# Patient Record
Sex: FEMALE | Race: ASIAN | NOT HISPANIC OR LATINO
[De-identification: names, ages, dates, MRNs, and addresses within clinical notes are randomized per-mention and may not be internally consistent; named-entity substitution may affect disease eponyms.]

---

## 2022-06-06 PROBLEM — Z00.00 ENCOUNTER FOR PREVENTIVE HEALTH EXAMINATION: Status: ACTIVE | Noted: 2022-06-06

## 2022-06-08 ENCOUNTER — APPOINTMENT (OUTPATIENT)
Dept: ORTHOPEDIC SURGERY | Facility: CLINIC | Age: 32
End: 2022-06-08
Payer: MEDICAID

## 2022-06-08 VITALS — BODY MASS INDEX: 37.36 KG/M2 | WEIGHT: 203 LBS | HEIGHT: 62 IN

## 2022-06-08 DIAGNOSIS — M17.11 UNILATERAL PRIMARY OSTEOARTHRITIS, RIGHT KNEE: ICD-10-CM

## 2022-06-08 PROCEDURE — 73562 X-RAY EXAM OF KNEE 3: CPT | Mod: LT

## 2022-06-08 PROCEDURE — 99203 OFFICE O/P NEW LOW 30 MIN: CPT

## 2022-06-08 NOTE — ASSESSMENT
[FreeTextEntry1] : PT, tumeric. ice. wegiht loss. fu 6 wk\par \par The patient was advised of the diagnosis.  The natural history of the pathology was explained in full. All questions were answered.  The risks and benefits of conservative and interventional treatment alternatives were explained to the patient\par

## 2022-06-08 NOTE — IMAGING
[de-identified] : Left knee exam: \par \par General: no distress, no ligamentous laxity\par Skin: no significant pertinent finding\par Inspection: \par    Effusion: min\par    Malalignment: (-)\par    Swelling: (-)\par    Quad atrophy: (-)\par    J-sign: (-)\par ROM: \par    0 - 120 degrees of flexion.\par Tenderness: \par    MJLT: +\par    LJLT: +\par    Medial patellar facet tenderness: +\par    Lateral patellar facet tenderness: (-)\par    Crepitus: (-)\par    Patellar grind tenderness: (-)\par Stability: \par    Lachman: (-)\par    Varus/Valgus instability: (-)\par    Posterior drawer: (-)\par Additional tests: \par    McMurrays test: (-)\par    Patellar apprehension: (-)\par    Patellar tilt: (-)\par    Tight lateral retinaculum: (-)\par Strength: 5/5 Q/H/TA/GS/EHL\par Neuro: In tact to light touch throughout, DTR's wnl\par Vascularity: Extremity warm and well perfused\par Gait: normal.\par \par \par

## 2022-06-08 NOTE — HISTORY OF PRESENT ILLNESS
[Gradual] : gradual [3] : 3 [Dull/Aching] : dull/aching [Occasional] : occasional [de-identified] : This is Ms. RENE LOMBARDO  a 31 year female who comes in today complaining of left knee pain. pain is medial. notes pain with stairs, bending. no injjury. present for 2 weeks. previously present but resolved.  [] : no [de-identified] : bending leg

## 2022-09-25 ENCOUNTER — EMERGENCY (EMERGENCY)
Facility: HOSPITAL | Age: 32
LOS: 1 days | Discharge: HOME | End: 2022-09-25
Attending: STUDENT IN AN ORGANIZED HEALTH CARE EDUCATION/TRAINING PROGRAM | Admitting: STUDENT IN AN ORGANIZED HEALTH CARE EDUCATION/TRAINING PROGRAM

## 2022-09-25 VITALS
SYSTOLIC BLOOD PRESSURE: 132 MMHG | TEMPERATURE: 99 F | DIASTOLIC BLOOD PRESSURE: 93 MMHG | OXYGEN SATURATION: 100 % | HEART RATE: 76 BPM | RESPIRATION RATE: 16 BRPM

## 2022-09-25 VITALS
DIASTOLIC BLOOD PRESSURE: 60 MMHG | RESPIRATION RATE: 18 BRPM | OXYGEN SATURATION: 100 % | HEART RATE: 56 BPM | SYSTOLIC BLOOD PRESSURE: 112 MMHG | TEMPERATURE: 98 F

## 2022-09-25 LAB
ALBUMIN SERPL ELPH-MCNC: 4.1 G/DL — SIGNIFICANT CHANGE UP (ref 3.3–5)
ALP SERPL-CCNC: 78 U/L — SIGNIFICANT CHANGE UP (ref 40–120)
ALT FLD-CCNC: 18 U/L — SIGNIFICANT CHANGE UP (ref 4–33)
ANION GAP SERPL CALC-SCNC: 11 MMOL/L — SIGNIFICANT CHANGE UP (ref 7–14)
APTT BLD: 36.8 SEC — HIGH (ref 27–36.3)
AST SERPL-CCNC: 23 U/L — SIGNIFICANT CHANGE UP (ref 4–32)
BASOPHILS # BLD AUTO: 0.02 K/UL — SIGNIFICANT CHANGE UP (ref 0–0.2)
BASOPHILS NFR BLD AUTO: 0.2 % — SIGNIFICANT CHANGE UP (ref 0–2)
BILIRUB SERPL-MCNC: 0.3 MG/DL — SIGNIFICANT CHANGE UP (ref 0.2–1.2)
BLD GP AB SCN SERPL QL: NEGATIVE — SIGNIFICANT CHANGE UP
BUN SERPL-MCNC: 9 MG/DL — SIGNIFICANT CHANGE UP (ref 7–23)
CALCIUM SERPL-MCNC: 9.9 MG/DL — SIGNIFICANT CHANGE UP (ref 8.4–10.5)
CHLORIDE SERPL-SCNC: 103 MMOL/L — SIGNIFICANT CHANGE UP (ref 98–107)
CO2 SERPL-SCNC: 24 MMOL/L — SIGNIFICANT CHANGE UP (ref 22–31)
CREAT SERPL-MCNC: 0.7 MG/DL — SIGNIFICANT CHANGE UP (ref 0.5–1.3)
EGFR: 117 ML/MIN/1.73M2 — SIGNIFICANT CHANGE UP
EOSINOPHIL # BLD AUTO: 0.14 K/UL — SIGNIFICANT CHANGE UP (ref 0–0.5)
EOSINOPHIL NFR BLD AUTO: 1.1 % — SIGNIFICANT CHANGE UP (ref 0–6)
GLUCOSE SERPL-MCNC: 90 MG/DL — SIGNIFICANT CHANGE UP (ref 70–99)
HCG SERPL-ACNC: 5532 MIU/ML — SIGNIFICANT CHANGE UP
HCT VFR BLD CALC: 38.6 % — SIGNIFICANT CHANGE UP (ref 34.5–45)
HGB BLD-MCNC: 12.7 G/DL — SIGNIFICANT CHANGE UP (ref 11.5–15.5)
IANC: 8.4 K/UL — HIGH (ref 1.8–7.4)
IMM GRANULOCYTES NFR BLD AUTO: 0.3 % — SIGNIFICANT CHANGE UP (ref 0–0.9)
INR BLD: 1.13 RATIO — SIGNIFICANT CHANGE UP (ref 0.88–1.16)
LIDOCAIN IGE QN: 31 U/L — SIGNIFICANT CHANGE UP (ref 7–60)
LYMPHOCYTES # BLD AUTO: 2.87 K/UL — SIGNIFICANT CHANGE UP (ref 1–3.3)
LYMPHOCYTES # BLD AUTO: 23.1 % — SIGNIFICANT CHANGE UP (ref 13–44)
MCHC RBC-ENTMCNC: 27.7 PG — SIGNIFICANT CHANGE UP (ref 27–34)
MCHC RBC-ENTMCNC: 32.9 GM/DL — SIGNIFICANT CHANGE UP (ref 32–36)
MCV RBC AUTO: 84.1 FL — SIGNIFICANT CHANGE UP (ref 80–100)
MONOCYTES # BLD AUTO: 0.93 K/UL — HIGH (ref 0–0.9)
MONOCYTES NFR BLD AUTO: 7.5 % — SIGNIFICANT CHANGE UP (ref 2–14)
NEUTROPHILS # BLD AUTO: 8.4 K/UL — HIGH (ref 1.8–7.4)
NEUTROPHILS NFR BLD AUTO: 67.8 % — SIGNIFICANT CHANGE UP (ref 43–77)
NRBC # BLD: 0 /100 WBCS — SIGNIFICANT CHANGE UP (ref 0–0)
NRBC # FLD: 0 K/UL — SIGNIFICANT CHANGE UP (ref 0–0)
PLATELET # BLD AUTO: 230 K/UL — SIGNIFICANT CHANGE UP (ref 150–400)
POTASSIUM SERPL-MCNC: 4.3 MMOL/L — SIGNIFICANT CHANGE UP (ref 3.5–5.3)
POTASSIUM SERPL-SCNC: 4.3 MMOL/L — SIGNIFICANT CHANGE UP (ref 3.5–5.3)
PROT SERPL-MCNC: 8.1 G/DL — SIGNIFICANT CHANGE UP (ref 6–8.3)
PROTHROM AB SERPL-ACNC: 13.1 SEC — SIGNIFICANT CHANGE UP (ref 10.5–13.4)
RBC # BLD: 4.59 M/UL — SIGNIFICANT CHANGE UP (ref 3.8–5.2)
RBC # FLD: 13.2 % — SIGNIFICANT CHANGE UP (ref 10.3–14.5)
RH IG SCN BLD-IMP: POSITIVE — SIGNIFICANT CHANGE UP
SODIUM SERPL-SCNC: 138 MMOL/L — SIGNIFICANT CHANGE UP (ref 135–145)
WBC # BLD: 12.4 K/UL — HIGH (ref 3.8–10.5)
WBC # FLD AUTO: 12.4 K/UL — HIGH (ref 3.8–10.5)

## 2022-09-25 PROCEDURE — 99285 EMERGENCY DEPT VISIT HI MDM: CPT

## 2022-09-25 PROCEDURE — 76830 TRANSVAGINAL US NON-OB: CPT | Mod: 26

## 2022-09-25 RX ORDER — SODIUM CHLORIDE 9 MG/ML
1000 INJECTION INTRAMUSCULAR; INTRAVENOUS; SUBCUTANEOUS ONCE
Refills: 0 | Status: COMPLETED | OUTPATIENT
Start: 2022-09-25 | End: 2022-09-25

## 2022-09-25 RX ADMIN — SODIUM CHLORIDE 1000 MILLILITER(S): 9 INJECTION INTRAMUSCULAR; INTRAVENOUS; SUBCUTANEOUS at 13:53

## 2022-09-25 NOTE — ED PROVIDER NOTE - NSFOLLOWUPINSTRUCTIONS_ED_ALL_ED_FT
You  have been seen for vaginal bleeding.     Your results are in the following pages.    As discussed, please follow up with your OBGYN at your scheduled appointment in 2 days.     Please return to the emergency department for any new or concerning symptoms including but not limited to fever, pain, worsening bleeding, weakness, dizziness, chest pain, shortness of breath.

## 2022-09-25 NOTE — ED ADULT TRIAGE NOTE - CHIEF COMPLAINT QUOTE
Pt states she is 10 weeks pregnant c/o of cramps and vaginal bleeding was scheduled for D@C on Tuesday but pain is worse today and having heavy vaginal bleeding

## 2022-09-25 NOTE — ED ADULT NURSE NOTE - OBJECTIVE STATEMENT
pt received at intake rm 1 AAO x 3. pt reports she is scheduled for D & C on tuesday. pt reports 10 week pregnancy. pt c/o vaginal bleeding since last night soaking about 8 pads and passing large clots. pt also reports dizziness, chills and fevers. pt denies sob, chest pain, n/v/d. respirations even and unlabored. 20g iv placed to right ac.

## 2022-09-25 NOTE — ED PROVIDER NOTE - NS ED ROS FT
ROS   GENERAL: No fever, no chills, no malaise, no fatigue   ENT: No earache, no coryza, no sore throat   NECK: No stiffness, no swollen glands, no dysphagia   RESPIRATORY: No cough, no dyspnea, no pleuritic chest pain   HEART: no chest pain, no palpitations, no edema, no jvd   ABDOMEN: No abdominal pain, no nausea, no vomiting, no diarrhea   : SEE HPI  MUSCULAR-SKELETAL: No myalgia, no arthralgia   NEUROLOGY: No headache, no vertigo, no paresthesia, no focal deficits, no diplopia   SKIN: No rash, no evidence of trauma  All other ROS are negative

## 2022-09-25 NOTE — CONSULT NOTE ADULT - ASSESSMENT
30yo  @ 12w1d by LMP 2022 presenting for worsening vaginal bleeding and abdominal cramping after diagnosed with MAB 9/15/22 with scheduled D+C on . Pt clinically and hemodynamically stable at this time.    - Physical exam with visibly dilated cervix with passage of clots  - Pt counseled on options of expectant with plan for scheduled D+C vs medical management with cytotec vs surgical management with D&C.  - Pt opting for expectant management with plan for scheduled D+C  - Reviewed pain management with Motrin 600mg q6hr for pain  - Reviewed importance of adequate PO hydration   - Return precautions reviewed including worsening bleeding, symptoms of anemia, or worsening abdominal pain    seen w/Dr. Tremaine Guadarrama R4     32yo  @ 12w1d by LMP 2022 presenting for worsening vaginal bleeding and abdominal cramping after diagnosed with MAB 9/15/22 with scheduled D+C on . Pt clinically and hemodynamically stable at this time.    - Physical exam with visibly dilated cervix with passage of clots  - Reviewed imaging with gestational sac in DAVID, no FHR, measuring smaller than dates, and Hgb 12.7  - Pt counseled on options of expectant with plan for scheduled D+C vs medical management with cytotec vs surgical management with D&C.  - Pt opting for expectant management with plan for scheduled D+C  - Reviewed pain management with Motrin 600mg q6hr for pain  - Reviewed importance of adequate PO hydration   - Return precautions reviewed including worsening bleeding, symptoms of anemia, or worsening abdominal pain    seen w/Dr. Tremaine Guadarrama R4     30yo  @ 12w1d by LMP 2022 presenting for worsening vaginal bleeding and abdominal cramping after diagnosed with MAB 9/15/22 with scheduled D+C on . Pt clinically and hemodynamically stable at this time.    - Physical exam with visibly dilated cervix with passage of clots  - Reviewed imaging with gestational sac in DAVID, no FHR, measuring smaller than dates, and Hgb 12.7  - Rh positive, no rhogam needed  - Pt counseled on options of expectant with plan for scheduled D+C vs medical management with cytotec vs surgical management with D&C.  - Pt opting for expectant management with plan for scheduled D+C  - Reviewed pain management with Motrin 600mg q6hr for pain  - Reviewed importance of adequate PO hydration   - Return precautions reviewed including worsening bleeding, symptoms of anemia, or worsening abdominal pain    seen w/Dr. Tremaine Guadarrama R4

## 2022-09-25 NOTE — ED PROVIDER NOTE - PROGRESS NOTE DETAILS
Case discussed with OBGYN, pending bedside eval. Pt seen by OBGYN. Requesting to go home rather than in patient treatment and follow up with her OBGYN in 2 days. Pt states feeling better since arrival. Results reviewed and discussed with the patient. Patient verbalized understanding as well as outpatient treatment and follow up plan, including the importance of timely outpatient follow up. The patient was given verbal and written discharge instructions, including instructions when to return to the ED and when to seek follow-up from their pcp. Any specialty follow-up was discussed, including how to make a appointment.  Instructions were discussed in simple, plain language and understanding verbalized by the patient. The patient understands that should their symptoms worsen or any new symptoms arise they should return to the ED immediately for further evaluation. All patient's questions were answered. Patient verbalizes understanding and agreement with outpatient treatment and follow up plan. Patient is medically cleared for discharge at this time.

## 2022-09-25 NOTE — CONSULT NOTE ADULT - SUBJECTIVE AND OBJECTIVE BOX
R4 GYNECOLOGY CONSULT NOTE    HPI  32yo  @ 12w1d by LMP 2022 presenting for worsening vaginal bleeding and abdominal cramping after diagnosed with MAB 9/15/22. Pt reports that she is scheduled for D+C on . She started to have spotting on  which progressed throughout the day yesterday and worsened overnight and was menses like. Pt reports using 2-3 regular pads/hour, saturated, with passage of large clots. She additionally reports decreased appetite x2 days. Due to worsening bleeding and lightheadedness, she presented for evaluation. Today she reports using 10 pads, changing for hygiene and some saturation. She additionally notes abdominal cramping, started overnight, improved with Tylenol. Denies CP, SOB, HA; reports improvement in lightheadedness since receiving IV fluids. Last PO was 10am cup of tea and egg.     OB/GYN HISTORY:   G1  C/S failed IOL  G2 2022 rC/S  G3 current  denies fibroids, ov cysts, STIs, abnl Pap smears    PMH: obese  PSH: C/S (, )    Meds: none  All: NKDA    Soc: denies T/E/D  Psych: denies    Will accept blood products.     REVIEW OF SYSTEMS:  CONSTITUTIONAL: No weakness, fevers or chills  RESPIRATORY: No cough, wheezing, hemoptysis; No shortness of breath  CARDIOVASCULAR: No chest pain or palpitations  GASTROINTESTINAL: +abdominal cramping. No nausea, vomiting, or hematemesis; No diarrhea or constipation. No melena or hematochezia.  GENITOURINARY: +vaginal beeding. No dysuria, frequency or hematuria  All other review of systems is negative unless indicated above.      Vital Signs Last 24 Hrs  T(C): 36.8 (25 Sep 2022 16:31), Max: 37 (25 Sep 2022 12:06)  T(F): 98.2 (25 Sep 2022 16:31), Max: 98.6 (25 Sep 2022 12:06)  HR: 56 (25 Sep 2022 16:31) (56 - 76)  BP: 112/60 (25 Sep 2022 16:31) (112/60 - 132/93)  RR: 18 (25 Sep 2022 16:31) (16 - 18)  SpO2: 100% (25 Sep 2022 16:31) (100% - 100%)    Parameters below as of 25 Sep 2022 16:31  Patient On (Oxygen Delivery Method): room air        PHYSICAL EXAM:  Constitutional: alert and oriented x 3  Chest: nonlabored breathing  Abd: soft, nontender, nondistended, no rebound/guarding  Genitourinary: NEFG  SSE: external os visibly dilated 1cm, passage of clots w/valsalva  LE: nontender    Lymph Nodes:        LABS:                        12.7   12.40 )-----------( 230      ( 25 Sep 2022 13:56 )             38.6         138  |  103  |  9   ----------------------------<  90  4.3   |  24  |  0.70    Ca    9.9      25 Sep 2022 13:56    TPro  8.1  /  Alb  4.1  /  TBili  0.3  /  DBili  x   /  AST  23  /  ALT  18  /  AlkPhos  78      PT/INR - ( 25 Sep 2022 13:56 )   PT: 13.1 sec;   INR: 1.13 ratio         PTT - ( 25 Sep 2022 13:56 )  PTT:36.8 sec      Blood Type: A Positive      RADIOLOGY & ADDITIONAL STUDIES:  < from: US Transvaginal (22 @ 14:23) >    ACC: [id number] EXAM:  US TRANSVAGINAL                          PROCEDURE DATE:  2022          INTERPRETATION:  CLINICAL INFORMATION: Vaginal bleeding in pregnancy.    LMP: 2022    COMPARISON: None available.    TECHNIQUE:  Endovaginal pelvic sonogram as per order. Transabdominal pelvic sonogram   was also performed as part of our standard protocol. Color and Spectral   Doppler was performed.    FINDINGS:  Uterus: Single intrauterine gestational sac with fetal pole is   visualized. Low-lying gestational sac. The cervical os is closed.    Gestational sac: Slightly abnormal in shape with a mean sac diameter of 4   cm.  Fetal pole: Present  Crown to rump length: 11.3 mm  Gestational age: 8 weeks and 3 days per crown to rump length  Cardiac activity: Absent  Fetal heart rate: No fetal heart rate detected.    Right ovary: 3.3 cm x 1.9 cm x 2.3 cm. Corpus luteum measuring 1.6 cm.   Normal arterial and venous waveforms.  Left ovary: 3.0 cm x 1.2 cm x 2.8 cm. Within normal limits. Limited   spectral Doppler evaluation.    Fluid: None.    IMPRESSION:    Single intrauterine gestation without fetal cardiac activity and   gestational age greater than 7 weeks compatible with pregnancy failure.   There is a small sac is low-lying and nearthe cervix compatible with   inevitable miscarriage.    --- End of Report ---          TYRELL MIDDLETON DO; Resident Radiologist  This document has been electronically signed.  GIOVANNI PRITCHARD MD; Attending Radiologist  This document has been electronically signed. Sep 25 2022  3:10PM    < end of copied text >

## 2022-09-25 NOTE — ED PROVIDER NOTE - PATIENT PORTAL LINK FT
You can access the FollowMyHealth Patient Portal offered by [facility] by registering at the following website: [email or web address]. By joining [employer]’s FollowMyHealth portal, you will also be able to view your health information using other applications (apps) compatible with our system.

## 2022-09-25 NOTE — ED ADULT NURSE NOTE - CCCP TRG CHIEF CMPLNT
vaginal bleeding
Consent: The patient's consent was obtained including but not limited to risks of crusting, scabbing, blistering, scarring, darker or lighter pigmentary change, recurrence, incomplete removal and infection.
Detail Level: Simple
Render Post-Care Instructions In Note?: no
Duration Of Freeze Thaw-Cycle (Seconds): 3
Number Of Freeze-Thaw Cycles: 3 freeze-thaw cycles
Post-Care Instructions: I reviewed with the patient in detail post-care instructions. Patient is to wear sunprotection, and avoid picking at any of the treated lesions. Pt may apply Vaseline to crusted or scabbing areas.

## 2022-09-25 NOTE — ED PROVIDER NOTE - PHYSICAL EXAMINATION
GEN - NAD; well appearing; A&O x3   HEAD - NC/AT   EYES- PERRL, EOMI  ENT: Airway patent, mmm, Oral cavity and pharynx normal. No inflammation, swelling, exudate, or lesions.  NECK: Neck supple, non-tender without lymphadenopathy, no masses.  PULMONARY - CTA b/l, symmetric breath sounds. No W/R/R.  CARDIAC -s1s2, RRR, no M,G,R, No JVD  ABDOMEN - tender to suprapubic palpation  : DECLINED  exam   BACK - Normal  spine   EXTREMITIES - FROM, symmetric pulses, capillary refill < 2 seconds, no edema, 5/5 strength in b/l UE and LE  SKIN - no rash or bruising   NEUROLOGIC - alert, speech clear, no focal deficits, CN II-XII grossly intact, normal gait, sensation grossly intact  PSYCH -nl mood/affect, nl insight.

## 2022-09-25 NOTE — ED ADULT NURSE NOTE - NSIMPLEMENTINTERV_GEN_ALL_ED
Implemented All Universal Safety Interventions:  [place] to call system. Call bell, personal items and telephone within reach. Instruct patient to call for assistance. Room bathroom lighting operational. Non-slip footwear when patient is off stretcher. Physically safe environment: no spills, clutter or unnecessary equipment. Stretcher in lowest position, wheels locked, appropriate side rails in place.

## 2022-09-25 NOTE — ED PROVIDER NOTE - CLINICAL SUMMARY MEDICAL DECISION MAKING FREE TEXT BOX
32 y/o F, , at 10 weeks & 5 days pregnant, presents to ED c/o heavy vaginal bleeding and suprapubic abdominal pain for x1 day. Likely missed . Will obtain CBC, CMP, HCG, PTT, PT, INR, TNS, TVUS and discuss w/ OB team.

## 2022-09-25 NOTE — ED PROVIDER NOTE - OBJECTIVE STATEMENT
30 y/o F, , at 10 weeks & 5 days pregnant, w/ no PMHx presents to ED c/o heavy vaginal bleeding and suprapubic abdominal pain for x1 day. Pt reports she filled x2 pads yesterday. Notes associated headache and lightheadedness. Pt is supposed to be scheduled for D&C in x2 days, however, due to worsening bleeding came to ED. She states known fetus w/ out heart rate on prior ultrasound. Denies fever, chills, nausea, vomiting, and diarrhea.

## 2022-09-26 ENCOUNTER — EMERGENCY (EMERGENCY)
Facility: HOSPITAL | Age: 32
LOS: 1 days | Discharge: HOME | End: 2022-09-26
Attending: STUDENT IN AN ORGANIZED HEALTH CARE EDUCATION/TRAINING PROGRAM | Admitting: STUDENT IN AN ORGANIZED HEALTH CARE EDUCATION/TRAINING PROGRAM

## 2022-09-26 ENCOUNTER — RESULT REVIEW (OUTPATIENT)
Age: 32
End: 2022-09-26

## 2022-09-26 VITALS
HEART RATE: 65 BPM | TEMPERATURE: 98 F | OXYGEN SATURATION: 100 % | DIASTOLIC BLOOD PRESSURE: 68 MMHG | SYSTOLIC BLOOD PRESSURE: 123 MMHG | RESPIRATION RATE: 20 BRPM

## 2022-09-26 VITALS
RESPIRATION RATE: 18 BRPM | OXYGEN SATURATION: 100 % | TEMPERATURE: 98 F | SYSTOLIC BLOOD PRESSURE: 98 MMHG | HEART RATE: 69 BPM | DIASTOLIC BLOOD PRESSURE: 75 MMHG

## 2022-09-26 LAB
ALBUMIN SERPL ELPH-MCNC: 4.2 G/DL — SIGNIFICANT CHANGE UP (ref 3.3–5)
ALP SERPL-CCNC: 79 U/L — SIGNIFICANT CHANGE UP (ref 40–120)
ALT FLD-CCNC: 18 U/L — SIGNIFICANT CHANGE UP (ref 4–33)
ANION GAP SERPL CALC-SCNC: 13 MMOL/L — SIGNIFICANT CHANGE UP (ref 7–14)
APTT BLD: 34.3 SEC — SIGNIFICANT CHANGE UP (ref 27–36.3)
AST SERPL-CCNC: 27 U/L — SIGNIFICANT CHANGE UP (ref 4–32)
BASOPHILS # BLD AUTO: 0.02 K/UL — SIGNIFICANT CHANGE UP (ref 0–0.2)
BASOPHILS NFR BLD AUTO: 0.1 % — SIGNIFICANT CHANGE UP (ref 0–2)
BILIRUB SERPL-MCNC: 0.4 MG/DL — SIGNIFICANT CHANGE UP (ref 0.2–1.2)
BLD GP AB SCN SERPL QL: NEGATIVE — SIGNIFICANT CHANGE UP
BUN SERPL-MCNC: 11 MG/DL — SIGNIFICANT CHANGE UP (ref 7–23)
CALCIUM SERPL-MCNC: 9.5 MG/DL — SIGNIFICANT CHANGE UP (ref 8.4–10.5)
CHLORIDE SERPL-SCNC: 104 MMOL/L — SIGNIFICANT CHANGE UP (ref 98–107)
CO2 SERPL-SCNC: 21 MMOL/L — LOW (ref 22–31)
CREAT SERPL-MCNC: 0.7 MG/DL — SIGNIFICANT CHANGE UP (ref 0.5–1.3)
EGFR: 117 ML/MIN/1.73M2 — SIGNIFICANT CHANGE UP
EOSINOPHIL # BLD AUTO: 0.02 K/UL — SIGNIFICANT CHANGE UP (ref 0–0.5)
EOSINOPHIL NFR BLD AUTO: 0.1 % — SIGNIFICANT CHANGE UP (ref 0–6)
FLUAV AG NPH QL: SIGNIFICANT CHANGE UP
FLUBV AG NPH QL: SIGNIFICANT CHANGE UP
GLUCOSE SERPL-MCNC: 121 MG/DL — HIGH (ref 70–99)
HCG SERPL-ACNC: 2820 MIU/ML — SIGNIFICANT CHANGE UP
HCT VFR BLD CALC: 36.3 % — SIGNIFICANT CHANGE UP (ref 34.5–45)
HGB BLD-MCNC: 11.8 G/DL — SIGNIFICANT CHANGE UP (ref 11.5–15.5)
IANC: 13.12 K/UL — HIGH (ref 1.8–7.4)
IMM GRANULOCYTES NFR BLD AUTO: 0.4 % — SIGNIFICANT CHANGE UP (ref 0–0.9)
INR BLD: 1.14 RATIO — SIGNIFICANT CHANGE UP (ref 0.88–1.16)
LYMPHOCYTES # BLD AUTO: 0.97 K/UL — LOW (ref 1–3.3)
LYMPHOCYTES # BLD AUTO: 6.6 % — LOW (ref 13–44)
MCHC RBC-ENTMCNC: 27.7 PG — SIGNIFICANT CHANGE UP (ref 27–34)
MCHC RBC-ENTMCNC: 32.5 GM/DL — SIGNIFICANT CHANGE UP (ref 32–36)
MCV RBC AUTO: 85.2 FL — SIGNIFICANT CHANGE UP (ref 80–100)
MONOCYTES # BLD AUTO: 0.43 K/UL — SIGNIFICANT CHANGE UP (ref 0–0.9)
MONOCYTES NFR BLD AUTO: 2.9 % — SIGNIFICANT CHANGE UP (ref 2–14)
NEUTROPHILS # BLD AUTO: 13.12 K/UL — HIGH (ref 1.8–7.4)
NEUTROPHILS NFR BLD AUTO: 89.9 % — HIGH (ref 43–77)
NRBC # BLD: 0 /100 WBCS — SIGNIFICANT CHANGE UP (ref 0–0)
NRBC # FLD: 0 K/UL — SIGNIFICANT CHANGE UP (ref 0–0)
PLATELET # BLD AUTO: 212 K/UL — SIGNIFICANT CHANGE UP (ref 150–400)
POTASSIUM SERPL-MCNC: 4.7 MMOL/L — SIGNIFICANT CHANGE UP (ref 3.5–5.3)
POTASSIUM SERPL-SCNC: 4.7 MMOL/L — SIGNIFICANT CHANGE UP (ref 3.5–5.3)
PROT SERPL-MCNC: 7.8 G/DL — SIGNIFICANT CHANGE UP (ref 6–8.3)
PROTHROM AB SERPL-ACNC: 13.3 SEC — SIGNIFICANT CHANGE UP (ref 10.5–13.4)
RBC # BLD: 4.26 M/UL — SIGNIFICANT CHANGE UP (ref 3.8–5.2)
RBC # FLD: 13.1 % — SIGNIFICANT CHANGE UP (ref 10.3–14.5)
RH IG SCN BLD-IMP: POSITIVE — SIGNIFICANT CHANGE UP
RSV RNA NPH QL NAA+NON-PROBE: SIGNIFICANT CHANGE UP
SARS-COV-2 RNA SPEC QL NAA+PROBE: SIGNIFICANT CHANGE UP
SODIUM SERPL-SCNC: 138 MMOL/L — SIGNIFICANT CHANGE UP (ref 135–145)
WBC # BLD: 14.62 K/UL — HIGH (ref 3.8–10.5)
WBC # FLD AUTO: 14.62 K/UL — HIGH (ref 3.8–10.5)

## 2022-09-26 PROCEDURE — 99285 EMERGENCY DEPT VISIT HI MDM: CPT

## 2022-09-26 PROCEDURE — 76817 TRANSVAGINAL US OBSTETRIC: CPT | Mod: 26

## 2022-09-26 PROCEDURE — 88305 TISSUE EXAM BY PATHOLOGIST: CPT | Mod: 26

## 2022-09-26 RX ORDER — ACETAMINOPHEN 500 MG
650 TABLET ORAL ONCE
Refills: 0 | Status: COMPLETED | OUTPATIENT
Start: 2022-09-26 | End: 2022-09-26

## 2022-09-26 RX ORDER — IBUPROFEN 200 MG
600 TABLET ORAL ONCE
Refills: 0 | Status: COMPLETED | OUTPATIENT
Start: 2022-09-26 | End: 2022-09-26

## 2022-09-26 RX ADMIN — Medication 600 MILLIGRAM(S): at 17:06

## 2022-09-26 RX ADMIN — Medication 650 MILLIGRAM(S): at 17:06

## 2022-09-26 NOTE — ED PROVIDER NOTE - CLINICAL SUMMARY MEDICAL DECISION MAKING FREE TEXT BOX
32 yo F returns to the ED for worsening bleeding and pain and subjective fever. exam found pt uncomfortable in stretcher, in pain. pelvic tenderness b/l.   repeat labs and TVUS. consult GYN. motrin and tylenol for pain.   pelvic exam by gyn; gush of blood and tissue passing. gyn took out the passing tissue and sent to pathology lab.

## 2022-09-26 NOTE — ED PROVIDER NOTE - NS ED ATTENDING STATEMENT MOD
This was a shared visit with the SANDIP. I reviewed and verified the documentation and independently performed the documented:

## 2022-09-26 NOTE — CONSULT NOTE ADULT - SUBJECTIVE AND OBJECTIVE BOX
R4 GYNECOLOGY CONSULT NOTE    HPI  32yo  @ 12w2d by LMP 2022 presenting for worsening vaginal bleeding and abdominal cramping after diagnosed with MAB 9/15/22. Pt previously presented yesterday and opted for expectant management pending scheduled D+C for tomorrow. Pt reports requiring 4 large pads overnight and worsening abdominal cramping.     OB/GYN HISTORY:   OBGYN = [facility]  G1  C/S failed IOL  G2 2022 rC/S  G3 current  denies fibroids, ov cysts, STIs, abnl Pap smears    PMH: obese  PSH: C/S (, )    Meds: none  All: NKDA    Soc: denies T/E/D  Psych: denies    Will accept blood products.     REVIEW OF SYSTEMS:  CONSTITUTIONAL: No weakness, fevers or chills  RESPIRATORY: No cough, wheezing, hemoptysis; No shortness of breath  CARDIOVASCULAR: No chest pain or palpitations  GASTROINTESTINAL: +abdominal cramping  GENITOURINARY: +vaginal bleeding  All other review of systems is negative unless indicated above.    Vital Signs Last 24 Hrs  T(C): 36.8 (26 Sep 2022 12:48), Max: 36.8 (25 Sep 2022 16:31)  T(F): 98.3 (26 Sep 2022 12:48), Max: 98.3 (26 Sep 2022 12:48)  HR: 65 (26 Sep 2022 12:48) (56 - 65)  BP: 123/68 (26 Sep 2022 12:48) (112/60 - 123/68)  BP(mean): --  RR: 20 (26 Sep 2022 12:48) (18 - 20)  SpO2: 100% (26 Sep 2022 12:48) (100% - 100%)    Parameters below as of 26 Sep 2022 12:48  Patient On (Oxygen Delivery Method): room air      PHYSICAL EXAM:  Gen: awake, alert, NAD  Chest: nonlabored breathing  Abd: soft, nontender, nondistended; no rebound/guarding  : NEFG  SSE: large clot at cervical os, attempt to remove with sponge stick  SVE: cervix dilated 1-2cm w/gestation in cervical canal; ruptured; tissue collected  LE: nontender    LABS:                        12.7   12.40 )-----------( 230      ( 25 Sep 2022 13:56 )             38.6         138  |  103  |  9   ----------------------------<  90  4.3   |  24  |  0.70    Ca    9.9      25 Sep 2022 13:56    TPro  8.1  /  Alb  4.1  /  TBili  0.3  /  DBili  x   /  AST  23  /  ALT  18  /  AlkPhos  78      PT/INR - ( 25 Sep 2022 13:56 )   PT: 13.1 sec;   INR: 1.13 ratio         PTT - ( 25 Sep 2022 13:56 )  PTT:36.8 sec      Blood Type: A Positive      RADIOLOGY & ADDITIONAL STUDIES:

## 2022-09-26 NOTE — ED PROVIDER NOTE - ATTENDING APP SHARED VISIT CONTRIBUTION OF CARE
I have personally performed a face to face medical and diagnostic evaluation of the patient. I have discussed with and reviewed the ACP's note and agree with the History, ROS, Physical Exam and MDM unless otherwise indicated. A brief summary of my personal evaluation and impression can be found below.     30 y/o F, , at 10 weeks & 5 days pregnant, w/ no PMHx, returns to the ED for worsening pelvic pain and heavier vaginal bleeding since yesterday after ER visit. Per chart review - US yesterday showing IUP w no HR  consistent w fetal demise. Planned DC tomorrow as pt was not having contractions. Also had subjective fever last night and continues to have lightheadedness. Pain is unbearable even with motrin - leading to ER presentation. Denies n/v, f/c. On exam, VSS w active bleeding vaginally. Will obtain labs, US, OB consult. Reassess to dispo. Merrick Cruz, ED Attending

## 2022-09-26 NOTE — ED PROVIDER NOTE - PHYSICAL EXAMINATION
CONSTITUTIONAL: Well-appearing; well-nourished; in no apparent distress. Non-toxic appearing.   NEURO: Alert & oriented. Gait steady without assistance. Sensory and motor functions are grossly intact.  PSYCH: Mood appropriate. Thought processes intact.   NECK: Supple  CARD: Regular rate and rhythm, no murmurs  RESP: No accessory muscle use; breath sounds clear and equal bilaterally; no wheezes, rhonchi, or rales     ABD: lower abd tenderness B/L. no rebound. soft.   MUSCULOSKELETAL/EXTREMITIES: FROM in all four extremities; no extremity edema.  SKIN: Warm; dry; no apparent lesions or exudate

## 2022-09-26 NOTE — CONSULT NOTE ADULT - ASSESSMENT
32yo  @ 12w2d by LMP 2022 presenting for worsening vaginal bleeding and abdominal cramping after diagnosed with MAB 9/15/22 with scheduled D+C on . Pt clinically and hemodynamically stable at this time.    - Physical exam notable for passage of pregnancy; tissue collected, to be sent to pathology  - Rh positive, no rhogam needed  - Pain control with Motrin 600mg q6h alternating with Tylenol 1000mg q6h   - Rec complete workup with CBC, TVUS  - Further management pending completion of workup    d/w Dr. Christopher Guadarrama R4   30yo  @ 12w2d by LMP 2022 presenting for worsening vaginal bleeding and abdominal cramping after diagnosed with MAB 9/15/22 with scheduled D+C on . Pt clinically and hemodynamically stable at this time.    - Physical exam notable for passage of pregnancy; tissue collected, sent to pathology  - Rh positive, no rhogam needed  - Pain control with Motrin 600mg q6h alternating with Tylenol 1000mg q6h   - Rec complete workup with CBC, TVUS  - [facility] paperwork completed  - Further management pending completion of workup    d/w Dr. Christopher Guadarrama R4   30yo  @ 12w2d by LMP 2022 presenting for worsening vaginal bleeding and abdominal cramping after diagnosed with MAB 9/15/22 with scheduled D+C on . Pt clinically and hemodynamically stable at this time.    - Physical exam notable for passage of pregnancy; tissue collected;  pt w/improvement in cramping pain and vaginal bleeding with removal of tissue  - Tissue and clots sent for pathology  - Rh positive, no rhogam needed  - Pain control with Motrin 600mg q6h alternating with Tylenol 1000mg q6h   - Rec complete workup with CBC, TVUS  - [facility] paperwork completed  - Further management pending completion of workup    d/w Dr. Christopher Guadarrama R4   30yo  @ 12w2d by LMP 2022 presenting for worsening vaginal bleeding and abdominal cramping after diagnosed with MAB 9/15/22 with scheduled D+C on . Pt clinically and hemodynamically stable at this time.    - Physical exam notable for passage of pregnancy; tissue collected;  pt w/improvement in cramping pain and vaginal bleeding with removal of tissue  - Tissue and clots sent for pathology  - Rh positive, no rhogam needed  - Pain control with Motrin 600mg q6h alternating with Tylenol 1000mg q6h   - Rec complete workup with CBC, TVUS  - [facility] paperwork completed  - Further management pending completion of workup    d/w Dr. Christopher Guadarrama R4    ADDENDUM @ 8552  < from: US Transvaginal, OB (22 @ 17:21) >  ACC: [id number] EXAM:  US OB TRANSVAGINAL                          PROCEDURE DATE:  2022      INTERPRETATION:  CLINICAL INFORMATION: Miscarriage. Rule out retained   products.    LMP: 2022    COMPARISON: Pelvic ultrasound 2022    Endovaginal and transabdominal pelvic sonogram. Color and Spectral   Doppler was performed.    FINDINGS:  Uterus: 9.0 x 5.2 cm. Trace fluid is seen within the cervix. Previously   described single intrauterine gestational sac is no longer appreciated.  Endometrium: 1.0 cm. Endometrial canal without evidence of retained   products.    Right ovary: 1.9 cm x 2.1 cm x 2.1 cm. Right-sided corpus luteum. Normal   arterial and venous waveforms.  Left ovary: 2.9 cm x 1.8 cm x 1.2 cm. Within normal limits. Limited   spectral Doppler evaluation.    Fluid: None.    IMPRESSION:  Compared to pelvic ultrasound from 2022, gestational sac is no   longer seen reflecting passage of products of conception. Endometrial   canal without evidence of retained products. Trace fluid within the   cervical canal.    --- End of Report ---      KAT MEMBRENO MD; Resident Radiologist  This document has been electronically signed.  FRANCI GA MD; Attending Radiologist  This document has been electronically signed. Sep 26 2022  6:03PM    < end of copied text >      - imaging reviewed, no evidence of retained products of conception  - can f/u outpt with OBGYN in 1-2 weeks  - use motrin 600mg q6hr PRN cramping pain  - return to ED if worsening abdominal pain/cramping, vaginal bleeding, fevers/chills    D/w Dr. Blaine Guadarrama R4

## 2022-09-26 NOTE — ED PROVIDER NOTE - PATIENT PORTAL LINK FT
You can access the FollowMyHealth Patient Portal offered by [facility] by registering at the following website: [email or web address]. By joining [employer]’s FollowMyHealth portal, you will also be able to view your health information using other applications (apps) compatible with our system.

## 2022-09-26 NOTE — ED PROVIDER NOTE - OBJECTIVE STATEMENT
32 y/o F, , at 10 weeks & 5 days pregnant, w/ no PMHx, returns to the ED for worsening pelvic pain and heavier vaginal bleeding since yesterday after ER visit. she was using 2 pads per day but now increases to 8 pads. also had subjective fever last night and continues to have lightheadedness. pain is unbearable even with motrin.

## 2022-09-26 NOTE — ED PROVIDER NOTE - NSFOLLOWUPINSTRUCTIONS_ED_ALL_ED_FT
Follow up outpatient with OBGYN tomorrow as scheduled.  Take Motrin 600 mg every 8 hours as needed for moderate pain or fevers -- take with food.   Return to ED if worsening abdominal pain/cramping, vaginal bleeding, fevers/chills

## 2022-09-26 NOTE — ED ADULT NURSE NOTE - OBJECTIVE STATEMENT
Pt received to intake room 9 states she is 10 weeks pregnant complaining of worsening pelvic pain and vaginal bleeding. Pt states she was in the ED yesterday and since then has been having heavier bleeding. Pt denies chest pain, sob, n/v/d.  labs drawn and sent.

## 2022-09-26 NOTE — ED ADULT TRIAGE NOTE - CHIEF COMPLAINT QUOTE
Pt is having a miscarriage, vaginal bleeding and is in pain. Pt due in private clinic in 2 days for D and C. Pt can not wait for appointment due to the amount of pain. She was seen yesterday for same but they were unable to perform.

## 2022-09-26 NOTE — ED PROVIDER NOTE - PROGRESS NOTE DETAILS
Pt likely passed products while awaiting US. Will still obtain US to assess for retained products. Updated on plan. Pt feels much better than initial presentation. Merrick Cruz, ED Attending gyn reviewed US and recommended outpatient gyn f/u. pt states that she has an obgyn appointment tomorrow. now bleeding is minimal. pt denies dizziness or worsening pain. stable for dc. Pt ambulating with ease no complaints. Results given and will follow up with gyn. She knows procedure for tomorrow is no longer necessary.

## 2022-09-30 LAB — SURGICAL PATHOLOGY STUDY: SIGNIFICANT CHANGE UP
